# Patient Record
Sex: FEMALE
[De-identification: names, ages, dates, MRNs, and addresses within clinical notes are randomized per-mention and may not be internally consistent; named-entity substitution may affect disease eponyms.]

---

## 2022-08-25 ENCOUNTER — HOSPITAL ENCOUNTER (EMERGENCY)
Dept: HOSPITAL 5 - ED | Age: 47
LOS: 1 days | Discharge: HOME | End: 2022-08-26
Payer: SELF-PAY

## 2022-08-25 DIAGNOSIS — R11.2: Primary | ICD-10-CM

## 2022-08-25 PROCEDURE — 99282 EMERGENCY DEPT VISIT SF MDM: CPT

## 2022-08-26 VITALS — DIASTOLIC BLOOD PRESSURE: 84 MMHG | SYSTOLIC BLOOD PRESSURE: 123 MMHG

## 2022-08-26 NOTE — EMERGENCY DEPARTMENT REPORT
ED General Adult HPI





- General


Chief complaint: Nausea/Vomiting/Diarrhea


Stated complaint: AB PAIN/NAUSEA


Time Seen by Provider: 08/26/22 04:37


Source: patient, family


Mode of arrival: Ambulatory


Limitations: Language Barrier





- History of Present Illness


Initial comments: 


Patient is a 47-year-old  female with  past medical history of recurring

bartholin cyst infections. Presents tonight for n/v/D after starting rx. 

clindamycin and bactrim prescribed on 8/22/2022. pt denies fevers or chills.  

nausea was exacerbated by po intake however pt is tolerating po intake at this 

time, pt appears non toxic  











- Related Data


                                  Previous Rx's











 Medication  Instructions  Recorded  Last Taken  Type


 


Ondansetron [Zofran Odt] 4 mg PO Q8HR PRN #12 tab.rapdis 08/26/22 Unknown Rx











                                    Allergies











Allergy/AdvReac Type Severity Reaction Status Date / Time


 


No Known Allergies Allergy   Verified 08/25/22 22:39














ED Review of Systems


ROS: 


Stated complaint: AB PAIN/NAUSEA


Other details as noted in HPI





Constitutional: denies: chills, fever


Eyes: as per HPI


ENT: denies: ear pain, throat pain


Respiratory: denies: cough, shortness of breath, wheezing


Cardiovascular: denies: chest pain, palpitations


Endocrine: no symptoms reported


Gastrointestinal: nausea, vomiting, diarrhea


Genitourinary: denies: urgency, dysuria, discharge


Musculoskeletal: denies: back pain, joint swelling, arthralgia


Skin: denies: rash, lesions


Neurological: denies: headache, weakness, paresthesias


Psychiatric: denies: anxiety, depression


Hematological/Lymphatic: denies: easy bleeding, easy bruising





ED Past Medical Hx





- Medications


Home Medications: 


                                Home Medications











 Medication  Instructions  Recorded  Confirmed  Last Taken  Type


 


Ondansetron [Zofran Odt] 4 mg PO Q8HR PRN #12 tab.rapdis 08/26/22  Unknown Rx














ED Physical Exam





- General


Limitations: Language Barrier


General appearance: alert, in no apparent distress





- Head


Head exam: Present: normocephalic, normal inspection





- Eye


Eye exam: Present: EOMI


Pupils: Present: normal accommodation





- ENT


ENT exam: Present: mucous membranes moist





- Neck


Neck exam: Present: normal inspection, full ROM.  Absent: tenderness





- Respiratory


Respiratory exam: Present: normal lung sounds bilaterally.  Absent: respiratory 

distress, wheezes





- Cardiovascular


Cardiovascular Exam: Present: regular rate, normal rhythm, normal heart sounds. 

Absent: systolic murmur, diastolic murmur, rubs, gallop





- GI/Abdominal


GI/Abdominal exam: Present: soft, normal bowel sounds.  Absent: distended, 

tenderness, guarding, rebound, rigid, bruit, hernia





- Rectal


Rectal exam: Present: deferred





- 


External exam: Present: other (word cath intact scant drainage no swelling no 

fluctuance ).  Absent: erythema





- Extremities Exam


Extremities exam: Present: normal inspection, full ROM, normal capillary refill





- Back Exam


Back exam: Present: normal inspection, full ROM.  Absent: CVA tenderness (R), 

CVA tenderness (L)





- Neurological Exam


Neurological exam: Present: alert, oriented X3, CN II-XII intact, normal gait





- Psychiatric


Psychiatric exam: Present: normal affect





- Skin


Skin exam: Present: warm, dry, intact, normal color.  Absent: rash





ED Course


                                   Vital Signs











  08/25/22





  22:35


 


Temperature 98.2 F


 


Pulse Rate 83


 


Respiratory 18





Rate 


 


Blood Pressure 126/81


 


O2 Sat by Pulse 100





Oximetry 














ED Medical Decision Making





- Medical Decision Making


Pt was seen on 8/22/2022 and discharge after I&D of right Bartholin cyst. pt was

dc'd wtih rx for clindamycin, ultram, and bactrim, , Word Cath check, placement 

intact minimal drainage, there is no fever or chills , no n/v at this time, plan

ad zofran prn, complete abx as prescribed and follow up with GYN in 3 days, 

return to emergency if symptoms worsen.   pt dc'd with rx for zofran odt prn 

nausea. pt is other tolerating po, voiding without problem, discharge is 

decreased. Chaperone was used for this case, pt's  at bedside, as her 

prefered .  





Critical care attestation.: 


If time is entered above; I have spent that time in minutes in the direct care 

of this critically ill patient, excluding procedure time.








ED Disposition


Clinical Impression: 


Nausea & vomiting


Qualifiers:


 Vomiting type: unspecified Qualified Code(s): R11.2 - Nausea with vomiting, 

unspecified





Disposition: 01 HOME / SELF CARE / HOMELESS


Is pt being admited?: No


Does the pt Need Aspirin: No


Condition: Stable


Instructions:  Nausea and Vomiting, Adult


Additional Instructions: 


Take medications as prescribed, only take clindamycin as ordered stop taking 

Bactrim.  Follow-up with GYN doctor in  3 days.  To have catheter removed or 

return to emergency department.  Turn to emergency department if nausea vomiting

persist.


Prescriptions: 


Ondansetron [Zofran Odt] 4 mg PO Q8HR PRN #12 tab.rapdis


 PRN Reason: Nausea


Referrals: 


ARBEN KELLY MD [Staff Physician] - 3-5 Days


Forms:  Work/School Release Form(ED)


Time of Disposition: 05:08


Print Language: Arabic